# Patient Record
Sex: FEMALE | Race: OTHER | Employment: OTHER | URBAN - METROPOLITAN AREA
[De-identification: names, ages, dates, MRNs, and addresses within clinical notes are randomized per-mention and may not be internally consistent; named-entity substitution may affect disease eponyms.]

---

## 2020-10-29 ENCOUNTER — HOSPITAL ENCOUNTER (EMERGENCY)
Facility: HOSPITAL | Age: 25
Discharge: HOME | End: 2020-10-29
Attending: EMERGENCY MEDICINE
Payer: MEDICARE

## 2020-10-29 ENCOUNTER — APPOINTMENT (OUTPATIENT)
Dept: GENERAL RADIOLOGY | Facility: HOSPITAL | Age: 25
End: 2020-10-29
Attending: EMERGENCY MEDICINE
Payer: MEDICARE

## 2020-10-29 VITALS
BODY MASS INDEX: 44.72 KG/M2 | RESPIRATION RATE: 16 BRPM | DIASTOLIC BLOOD PRESSURE: 58 MMHG | HEART RATE: 83 BPM | TEMPERATURE: 99 F | HEIGHT: 62 IN | OXYGEN SATURATION: 99 % | WEIGHT: 243 LBS | SYSTOLIC BLOOD PRESSURE: 102 MMHG

## 2020-10-29 DIAGNOSIS — J45.901 EXACERBATION OF ASTHMA, UNSPECIFIED ASTHMA SEVERITY, UNSPECIFIED WHETHER PERSISTENT: Primary | ICD-10-CM

## 2020-10-29 DIAGNOSIS — B34.9 VIRAL SYNDROME: ICD-10-CM

## 2020-10-29 PROCEDURE — 96361 HYDRATE IV INFUSION ADD-ON: CPT

## 2020-10-29 PROCEDURE — 81001 URINALYSIS AUTO W/SCOPE: CPT | Performed by: EMERGENCY MEDICINE

## 2020-10-29 PROCEDURE — 96360 HYDRATION IV INFUSION INIT: CPT

## 2020-10-29 PROCEDURE — 94640 AIRWAY INHALATION TREATMENT: CPT

## 2020-10-29 PROCEDURE — 93010 ELECTROCARDIOGRAM REPORT: CPT | Performed by: EMERGENCY MEDICINE

## 2020-10-29 PROCEDURE — 99284 EMERGENCY DEPT VISIT MOD MDM: CPT

## 2020-10-29 PROCEDURE — 71045 X-RAY EXAM CHEST 1 VIEW: CPT | Performed by: EMERGENCY MEDICINE

## 2020-10-29 PROCEDURE — 80048 BASIC METABOLIC PNL TOTAL CA: CPT | Performed by: EMERGENCY MEDICINE

## 2020-10-29 PROCEDURE — 84443 ASSAY THYROID STIM HORMONE: CPT | Performed by: EMERGENCY MEDICINE

## 2020-10-29 PROCEDURE — 85025 COMPLETE CBC W/AUTO DIFF WBC: CPT | Performed by: EMERGENCY MEDICINE

## 2020-10-29 PROCEDURE — 93005 ELECTROCARDIOGRAM TRACING: CPT

## 2020-10-29 PROCEDURE — 84484 ASSAY OF TROPONIN QUANT: CPT | Performed by: EMERGENCY MEDICINE

## 2020-10-29 PROCEDURE — 81025 URINE PREGNANCY TEST: CPT

## 2020-10-29 RX ORDER — ALBUTEROL SULFATE 90 UG/1
2 AEROSOL, METERED RESPIRATORY (INHALATION) EVERY 4 HOURS PRN
Qty: 1 INHALER | Refills: 0 | Status: SHIPPED | OUTPATIENT
Start: 2020-10-29 | End: 2020-11-28

## 2020-10-29 RX ORDER — LORATADINE 10 MG/1
10 TABLET ORAL DAILY
Qty: 30 TABLET | Refills: 0 | Status: SHIPPED | OUTPATIENT
Start: 2020-10-29 | End: 2020-11-28

## 2020-10-29 RX ORDER — ALBUTEROL SULFATE 90 UG/1
2 AEROSOL, METERED RESPIRATORY (INHALATION) ONCE
Status: COMPLETED | OUTPATIENT
Start: 2020-10-29 | End: 2020-10-29

## 2020-10-29 RX ORDER — FLUTICASONE PROPIONATE 50 MCG
2 SPRAY, SUSPENSION (ML) NASAL DAILY
Qty: 16 G | Refills: 0 | Status: SHIPPED | OUTPATIENT
Start: 2020-10-29 | End: 2020-11-28

## 2020-10-29 NOTE — ED NOTES
Patient presents with right sided chest pain, ambulatory into room 44. Notes negative COVID test, notes asthma exacerbation, uses albuterol at home, inhaler use started yesterday. Notes clear cough. Notes fatigue/eye pressure/nausea/diarrhea x weeks.

## 2020-10-29 NOTE — ED PROVIDER NOTES
Patient Seen in: [facility] AND [facility] Emergency Department    History   Patient presents with:  Chest Pain Angina    Stated Complaint: right sided chest pain for weeks     HPI    78-year-old female past no history of seasonal allergies/allergic rhinitis, asth None (Room air)       Current:/77   Pulse 85   Temp 98.6 °F (37 °C) (Oral)   Resp 20   Ht 157.5 cm (5' 2\")   Wt 110.2 kg   LMP 10/22/2020 (Exact Date)   SpO2 99%   BMI 44.45 kg/m²         Physical Exam   Constitutional: No distress. HEENT: MMM.  BL Xr Chest Ap Portable  (cpt=71045)    Result Date: 10/29/2020  PROCEDURE: XR CHEST AP PORTABLE  (CPT=71045) TIME: 3:47 p.m. COMPARISON: None. INDICATIONS: Right side chest pain and right breast pain with burning x3-4 weeks.  Headaches, eye pressure, fa discharge with MDI/spacer, flonase/clariting and PCP followup with patient advised to use maintenance steroid inhaler daily as prescribed; SARS-CoV-2 PCR pending with patient aware of same.     I was wearing at minimum a facemask and eye protection througho

## 2020-10-29 NOTE — ED INITIAL ASSESSMENT (HPI)
Pt to er from home with multiple c/o. Pt states she has been having a lot of symptoms and she has reached out the her pcp and they are not listening or doing anything about it.  Pt states she has been feeling weak, dizziness, fatigue, pressure behind her ey

## 2021-12-21 ENCOUNTER — HOSPITAL ENCOUNTER (EMERGENCY)
Facility: HOSPITAL | Age: 26
Discharge: LEFT BEFORE DISCHARGE | End: 2021-12-21
Attending: EMERGENCY MEDICINE
Payer: MEDICARE

## 2021-12-21 VITALS
HEIGHT: 62 IN | WEIGHT: 281 LBS | BODY MASS INDEX: 51.71 KG/M2 | OXYGEN SATURATION: 95 % | DIASTOLIC BLOOD PRESSURE: 98 MMHG | TEMPERATURE: 98 F | HEART RATE: 100 BPM | RESPIRATION RATE: 19 BRPM | SYSTOLIC BLOOD PRESSURE: 157 MMHG

## 2021-12-21 PROCEDURE — 93005 ELECTROCARDIOGRAM TRACING: CPT

## 2021-12-21 PROCEDURE — 93010 ELECTROCARDIOGRAM REPORT: CPT | Performed by: INTERNAL MEDICINE

## 2021-12-21 NOTE — ED INITIAL ASSESSMENT (HPI)
Pt presents for cough, headache, SOB, chest pain for the past 2 days. Denies fevers. +COVID exposure.